# Patient Record
Sex: MALE | Race: WHITE | NOT HISPANIC OR LATINO | URBAN - METROPOLITAN AREA
[De-identification: names, ages, dates, MRNs, and addresses within clinical notes are randomized per-mention and may not be internally consistent; named-entity substitution may affect disease eponyms.]

---

## 2017-07-04 ENCOUNTER — INPATIENT (INPATIENT)
Facility: HOSPITAL | Age: 32
LOS: 0 days | Discharge: HOME | DRG: 394 | End: 2017-07-04
Attending: INTERNAL MEDICINE | Admitting: INTERNAL MEDICINE
Payer: COMMERCIAL

## 2017-07-04 VITALS
SYSTOLIC BLOOD PRESSURE: 161 MMHG | WEIGHT: 164.91 LBS | DIASTOLIC BLOOD PRESSURE: 112 MMHG | TEMPERATURE: 98 F | RESPIRATION RATE: 18 BRPM | OXYGEN SATURATION: 96 % | HEART RATE: 124 BPM

## 2017-07-04 VITALS
OXYGEN SATURATION: 99 % | DIASTOLIC BLOOD PRESSURE: 70 MMHG | HEART RATE: 70 BPM | RESPIRATION RATE: 18 BRPM | SYSTOLIC BLOOD PRESSURE: 115 MMHG | TEMPERATURE: 98 F

## 2017-07-04 DIAGNOSIS — R63.8 OTHER SYMPTOMS AND SIGNS CONCERNING FOOD AND FLUID INTAKE: ICD-10-CM

## 2017-07-04 DIAGNOSIS — Z98.890 OTHER SPECIFIED POSTPROCEDURAL STATES: Chronic | ICD-10-CM

## 2017-07-04 DIAGNOSIS — Z29.9 ENCOUNTER FOR PROPHYLACTIC MEASURES, UNSPECIFIED: ICD-10-CM

## 2017-07-04 DIAGNOSIS — K22.6 GASTRO-ESOPHAGEAL LACERATION-HEMORRHAGE SYNDROME: Chronic | ICD-10-CM

## 2017-07-04 DIAGNOSIS — T18.128A FOOD IN ESOPHAGUS CAUSING OTHER INJURY, INITIAL ENCOUNTER: ICD-10-CM

## 2017-07-04 LAB
ALBUMIN SERPL ELPH-MCNC: 5 G/DL — SIGNIFICANT CHANGE UP (ref 3.3–5)
ALP SERPL-CCNC: 49 U/L — SIGNIFICANT CHANGE UP (ref 40–120)
ALT FLD-CCNC: 32 U/L — SIGNIFICANT CHANGE UP (ref 10–45)
ANION GAP SERPL CALC-SCNC: 19 MMOL/L — HIGH (ref 5–17)
APTT BLD: 30.7 SEC — SIGNIFICANT CHANGE UP (ref 27.5–37.4)
AST SERPL-CCNC: 25 U/L — SIGNIFICANT CHANGE UP (ref 10–40)
BASOPHILS NFR BLD AUTO: 0.3 % — SIGNIFICANT CHANGE UP (ref 0–2)
BILIRUB SERPL-MCNC: 2.8 MG/DL — HIGH (ref 0.2–1.2)
BLD GP AB SCN SERPL QL: NEGATIVE — SIGNIFICANT CHANGE UP
BUN SERPL-MCNC: 11 MG/DL — SIGNIFICANT CHANGE UP (ref 7–23)
CALCIUM SERPL-MCNC: 10 MG/DL — SIGNIFICANT CHANGE UP (ref 8.4–10.5)
CHLORIDE SERPL-SCNC: 99 MMOL/L — SIGNIFICANT CHANGE UP (ref 96–108)
CO2 SERPL-SCNC: 22 MMOL/L — SIGNIFICANT CHANGE UP (ref 22–31)
CREAT SERPL-MCNC: 0.9 MG/DL — SIGNIFICANT CHANGE UP (ref 0.5–1.3)
EOSINOPHIL NFR BLD AUTO: 3.5 % — SIGNIFICANT CHANGE UP (ref 0–6)
GLUCOSE SERPL-MCNC: 92 MG/DL — SIGNIFICANT CHANGE UP (ref 70–99)
HCT VFR BLD CALC: 46.7 % — SIGNIFICANT CHANGE UP (ref 39–50)
HGB BLD-MCNC: 16.7 G/DL — SIGNIFICANT CHANGE UP (ref 13–17)
INR BLD: 1.06 — SIGNIFICANT CHANGE UP (ref 0.88–1.16)
LACTATE SERPL-SCNC: 1.2 MMOL/L — SIGNIFICANT CHANGE UP (ref 0.5–2)
LYMPHOCYTES # BLD AUTO: 13.2 % — SIGNIFICANT CHANGE UP (ref 13–44)
MCHC RBC-ENTMCNC: 30.6 PG — SIGNIFICANT CHANGE UP (ref 27–34)
MCHC RBC-ENTMCNC: 35.8 G/DL — SIGNIFICANT CHANGE UP (ref 32–36)
MCV RBC AUTO: 85.7 FL — SIGNIFICANT CHANGE UP (ref 80–100)
MONOCYTES NFR BLD AUTO: 8.3 % — SIGNIFICANT CHANGE UP (ref 2–14)
NEUTROPHILS NFR BLD AUTO: 74.7 % — SIGNIFICANT CHANGE UP (ref 43–77)
PLATELET # BLD AUTO: 251 K/UL — SIGNIFICANT CHANGE UP (ref 150–400)
POTASSIUM SERPL-MCNC: 3.8 MMOL/L — SIGNIFICANT CHANGE UP (ref 3.5–5.3)
POTASSIUM SERPL-SCNC: 3.8 MMOL/L — SIGNIFICANT CHANGE UP (ref 3.5–5.3)
PROT SERPL-MCNC: 8.1 G/DL — SIGNIFICANT CHANGE UP (ref 6–8.3)
PROTHROM AB SERPL-ACNC: 11.8 SEC — SIGNIFICANT CHANGE UP (ref 9.8–12.7)
RBC # BLD: 5.45 M/UL — SIGNIFICANT CHANGE UP (ref 4.2–5.8)
RBC # FLD: 12.1 % — SIGNIFICANT CHANGE UP (ref 10.3–16.9)
RH IG SCN BLD-IMP: POSITIVE — SIGNIFICANT CHANGE UP
RH IG SCN BLD-IMP: POSITIVE — SIGNIFICANT CHANGE UP
SODIUM SERPL-SCNC: 140 MMOL/L — SIGNIFICANT CHANGE UP (ref 135–145)
WBC # BLD: 11.5 K/UL — HIGH (ref 3.8–10.5)
WBC # FLD AUTO: 11.5 K/UL — HIGH (ref 3.8–10.5)

## 2017-07-04 PROCEDURE — 99285 EMERGENCY DEPT VISIT HI MDM: CPT | Mod: 25

## 2017-07-04 PROCEDURE — 93010 ELECTROCARDIOGRAM REPORT: CPT

## 2017-07-04 PROCEDURE — 71045 X-RAY EXAM CHEST 1 VIEW: CPT

## 2017-07-04 PROCEDURE — 85610 PROTHROMBIN TIME: CPT

## 2017-07-04 PROCEDURE — 36415 COLL VENOUS BLD VENIPUNCTURE: CPT

## 2017-07-04 PROCEDURE — 83605 ASSAY OF LACTIC ACID: CPT

## 2017-07-04 PROCEDURE — G0378: CPT

## 2017-07-04 PROCEDURE — 86850 RBC ANTIBODY SCREEN: CPT

## 2017-07-04 PROCEDURE — 99223 1ST HOSP IP/OBS HIGH 75: CPT | Mod: GC

## 2017-07-04 PROCEDURE — 86900 BLOOD TYPING SEROLOGIC ABO: CPT

## 2017-07-04 PROCEDURE — 93005 ELECTROCARDIOGRAM TRACING: CPT

## 2017-07-04 PROCEDURE — 80053 COMPREHEN METABOLIC PANEL: CPT

## 2017-07-04 PROCEDURE — 74220 X-RAY XM ESOPHAGUS 1CNTRST: CPT | Mod: 26

## 2017-07-04 PROCEDURE — 85025 COMPLETE CBC W/AUTO DIFF WBC: CPT

## 2017-07-04 PROCEDURE — 85730 THROMBOPLASTIN TIME PARTIAL: CPT

## 2017-07-04 PROCEDURE — 96374 THER/PROPH/DIAG INJ IV PUSH: CPT

## 2017-07-04 PROCEDURE — 70360 X-RAY EXAM OF NECK: CPT | Mod: 26

## 2017-07-04 PROCEDURE — 70360 X-RAY EXAM OF NECK: CPT

## 2017-07-04 PROCEDURE — 71010: CPT | Mod: 26

## 2017-07-04 PROCEDURE — 74220 X-RAY XM ESOPHAGUS 1CNTRST: CPT

## 2017-07-04 PROCEDURE — 86901 BLOOD TYPING SEROLOGIC RH(D): CPT

## 2017-07-04 RX ORDER — GLUCAGON INJECTION, SOLUTION 0.5 MG/.1ML
1 INJECTION, SOLUTION SUBCUTANEOUS ONCE
Qty: 0 | Refills: 0 | Status: COMPLETED | OUTPATIENT
Start: 2017-07-04 | End: 2017-07-04

## 2017-07-04 RX ORDER — PANTOPRAZOLE SODIUM 20 MG/1
1 TABLET, DELAYED RELEASE ORAL
Qty: 0 | Refills: 0 | COMMUNITY
Start: 2017-07-04

## 2017-07-04 RX ORDER — SODIUM CHLORIDE 9 MG/ML
1000 INJECTION INTRAMUSCULAR; INTRAVENOUS; SUBCUTANEOUS ONCE
Qty: 0 | Refills: 0 | Status: COMPLETED | OUTPATIENT
Start: 2017-07-04 | End: 2017-07-04

## 2017-07-04 RX ORDER — PANTOPRAZOLE SODIUM 20 MG/1
1 TABLET, DELAYED RELEASE ORAL
Qty: 30 | Refills: 0 | OUTPATIENT
Start: 2017-07-04 | End: 2017-08-03

## 2017-07-04 RX ORDER — PANTOPRAZOLE SODIUM 20 MG/1
40 TABLET, DELAYED RELEASE ORAL
Qty: 0 | Refills: 0 | Status: DISCONTINUED | OUTPATIENT
Start: 2017-07-04 | End: 2017-07-04

## 2017-07-04 RX ADMIN — GLUCAGON INJECTION, SOLUTION 1 MILLIGRAM(S): 0.5 INJECTION, SOLUTION SUBCUTANEOUS at 11:27

## 2017-07-04 RX ADMIN — SODIUM CHLORIDE 1000 MILLILITER(S): 9 INJECTION INTRAMUSCULAR; INTRAVENOUS; SUBCUTANEOUS at 11:01

## 2017-07-04 RX ADMIN — GLUCAGON INJECTION, SOLUTION 1 MILLIGRAM(S): 0.5 INJECTION, SOLUTION SUBCUTANEOUS at 13:57

## 2017-07-04 RX ADMIN — GLUCAGON INJECTION, SOLUTION 1 MILLIGRAM(S): 0.5 INJECTION, SOLUTION SUBCUTANEOUS at 12:06

## 2017-07-04 NOTE — DISCHARGE NOTE ADULT - CONDITION (STATED IN TERMS THAT PERMIT A SPECIFIC MEASURABLE COMPARISON WITH CONDITION ON ADMISSION):
pt improved significantly ever since presentation, initially tachycardic and high bp on presentation, currently vitally stable. On presentation pt couldn't tolerate oral sips which he is able to tolerate now and swallow down.

## 2017-07-04 NOTE — DISCHARGE NOTE ADULT - PATIENT PORTAL LINK FT
“You can access the [facility] Patient Portal, offered by [facility], by registering with the following website: [email or web address]”

## 2017-07-04 NOTE — H&P ADULT - NSHPLABSRESULTS_GEN_ALL_CORE
.  LABS:                         16.7   11.5  )-----------( 251      ( 04 Jul 2017 10:57 )             46.7     07-04    140  |  99  |  11  ----------------------------<  92  3.8   |  22  |  0.90    Ca    10.0      04 Jul 2017 10:57    TPro  8.1  /  Alb  5.0  /  TBili  2.8<H>  /  DBili  x   /  AST  25  /  ALT  32  /  AlkPhos  49  07-04    PT/INR - ( 04 Jul 2017 10:57 )   PT: 11.8 sec;   INR: 1.06          PTT - ( 04 Jul 2017 10:57 )  PTT:30.7 sec          Lactate, Blood: 1.2 mmoL/L (07-04 @ 10:57)      RADIOLOGY, EKG & ADDITIONAL TESTS: Reviewed.

## 2017-07-04 NOTE — ED PROVIDER NOTE - FAMILY HISTORY
Father  Still living? Unknown  Family history of ischemic heart disease, Age at diagnosis: Age Unknown  Family history of MI (myocardial infarction), Age at diagnosis: Age Unknown

## 2017-07-04 NOTE — H&P ADULT - ATTENDING COMMENTS
pt seen and examined by me. comofrtable. reports improvement in abdominal pain. no vomitting. no hematemesis.  VSS  exam- unremarkable  plan d/w GI. pt to get esophogram. NPO, GI following.

## 2017-07-04 NOTE — CONSULT NOTE ADULT - SUBJECTIVE AND OBJECTIVE BOX
32yo M w/ PMH Negin-Kay tear (2010; requiring several days ICU adm intubated, s/p surgical repair) presenting with the feeling of obstruction in his throat x18hrs after having a piece of steak and 2 cups black coffee, preventing him from tolerating PO solids or liquids. Pt states he's had this several times in the past 3 years but that it usually resolves on its own within a few hours; this episode is the longest its ever lasted. Denies dysphagia or problems swallowing but says that liquids and solids are getting "stuck" in his distal esophagus that eventually "backs up" and feels the need to regurgitate it out. Denies vomiting with this current episode but does say he was vomiting friday night 2/2 EtOH at a wedding. Endorses mild epigastric discomfort. Tried zantac and PPI with no improvement (pills get regurgitated); positional changes have no effect on ability to tolerate PO. No hematemesis at any point since his Negin-Kay tear. No F/C, dizziness, CP, SOB, N/V/D, melena, hematochezia, hematemesis, urinary sx, recent illness. Does not have a gastroenterologist and has not been scoped since his M-W tear in 2010.    PAST MEDICAL & SURGICAL HISTORY:  Negin-Kay tear        MEDICATIONS  (STANDING):    MEDICATIONS  (PRN):      Allergies    No Known Allergies    Intolerances        SOCIAL HISTORY: + EtOH     FAMILY HISTORY:  Family history of MI (myocardial infarction) (Father): Father w/ first MI @ age 40; 5 total MIs  Family history of ischemic heart disease (Father)      Vital Signs Last 24 Hrs  T(C): 37.1 (04 Jul 2017 10:59), Max: 37.1 (04 Jul 2017 10:59)  T(F): 98.7 (04 Jul 2017 10:59), Max: 98.7 (04 Jul 2017 10:59)  HR: 101 (04 Jul 2017 10:59) (101 - 124)  BP: 136/89 (04 Jul 2017 10:59) (136/89 - 161/112)  BP(mean): --  RR: 18 (04 Jul 2017 10:59) (18 - 18)  SpO2: 98% (04 Jul 2017 10:59) (96% - 98%)    PHYSICAL EXAM:    GEN: AOx3 NAD, unable to tolerate secretions  HEENT: anicteric  CHEST: equal chest rise b/l  CVS: no m/r/g  ABD: soft, nt, nd bs+      LABS:                        16.7   11.5  )-----------( 251      ( 04 Jul 2017 10:57 )             46.7     07-04    140  |  99  |  11  ----------------------------<  92  3.8   |  22  |  0.90    Ca    10.0      04 Jul 2017 10:57    TPro  8.1  /  Alb  5.0  /  TBili  2.8<H>  /  DBili  x   /  AST  25  /  ALT  32  /  AlkPhos  49  07-04    PT/INR - ( 04 Jul 2017 10:57 )   PT: 11.8 sec;   INR: 1.06          PTT - ( 04 Jul 2017 10:57 )  PTT:30.7 sec      RADIOLOGY & ADDITIONAL STUDIES:

## 2017-07-04 NOTE — H&P ADULT - FAMILY HISTORY
Father  Still living? Unknown  Family history of ischemic heart disease, Age at diagnosis: Age Unknown  Family history of MI (myocardial infarction), Age at diagnosis: Age Unknown     Grandparent  Still living? Unknown  Family history of colon cancer, Age at diagnosis: Age Unknown

## 2017-07-04 NOTE — ED PROVIDER NOTE - CHPI ED SYMPTOMS NEG
no diarrhea/no nausea/no blood in stool/no dysuria/no vomiting/no chills/no abdominal distension/no burning urination/no fever/no hematuria

## 2017-07-04 NOTE — DISCHARGE NOTE ADULT - PLAN OF CARE
-Eat small pieces of food  -Chew well before swallowing -Past Hx of elevated bilirubin-unknown cause  -Arrange out patient follow up to evaluate cause pt had food impaction on presentation, evaluated by gastroenterologist. -Eat small pieces of food  -Chew well before swallowing  -Continue PPI  -Follow up with Dr. Olivo -Past Hx of elevated bilirubin-unknown cause  -Follow up with PCP

## 2017-07-04 NOTE — ED PROVIDER NOTE - ATTENDING CONTRIBUTION TO CARE
pt with possible steak impaction x 18 hours, hx of Negin Kay tear in past - not tolerating po or secretions, glucagon, valium given and GI will admit for scope, pt stable at admission

## 2017-07-04 NOTE — ED ADULT TRIAGE NOTE - CHIEF COMPLAINT QUOTE
"I feel like something is stuck" pt c/o of "blockage" sensation to epigastric region x 18 hours, pt reports he has not been able to eat or drink anything without vomiting.

## 2017-07-04 NOTE — DISCHARGE NOTE ADULT - ADDITIONAL INSTRUCTIONS
Follow up with Dr. Olivo for evaluation Follow up with Dr. Olivo for GI evaluation   Follow up with PCP for evaluation of elevated bilirubin levels.

## 2017-07-04 NOTE — DISCHARGE NOTE ADULT - CARE PLAN
Principal Discharge DX:	Food impaction of esophagus  Instructions for follow-up, activity and diet:	-Eat small pieces of food  -Chew well before swallowing  Secondary Diagnosis:	Elevated bilirubin  Instructions for follow-up, activity and diet:	-Past Hx of elevated bilirubin-unknown cause  -Arrange out patient follow up to evaluate cause Principal Discharge DX:	Food impaction of esophagus  Goal:	pt had food impaction on presentation, evaluated by gastroenterologist.  Instructions for follow-up, activity and diet:	-Eat small pieces of food  -Chew well before swallowing  -Continue PPI  -Follow up with Dr. Olivo  Secondary Diagnosis:	Elevated bilirubin  Instructions for follow-up, activity and diet:	-Past Hx of elevated bilirubin-unknown cause  -Follow up with PCP

## 2017-07-04 NOTE — CONSULT NOTE ADULT - ASSESSMENT
31 year old male with hx of mack bernal tear presents with food impaction after eating steak. Pt will require endoscopic evaluation for removal of food particle.     -Can try glucagon 1mg x2 (10-15 min apart)  -Keep NPO  -Aspiration precautions   -Plan for Endoscopic removal     GI following.

## 2017-07-04 NOTE — DISCHARGE NOTE ADULT - MEDICATION SUMMARY - MEDICATIONS TO TAKE
I will START or STAY ON the medications listed below when I get home from the hospital:    pantoprazole 40 mg oral delayed release tablet  -- 1 tab(s) by mouth once a day (before a meal)  -- Indication: For Food impaction of esophagus

## 2017-07-04 NOTE — ED PROVIDER NOTE - MEDICAL DECISION MAKING DETAILS
pt likely w/ impacted food bolus in distal esophagus 2/2 h/o mack bernal tear- not tolerating liquids or solids by mouth; GI evaluating for EGD in afternoon if pt fails medical mgmt w/ glucagon and valium IVP - will admit to medicine in preparation for endoscopy.

## 2017-07-04 NOTE — ED PROVIDER NOTE - PROGRESS NOTE DETAILS
pertinent h/o mack-bernal tear, not able to tolerate any PO including water witnessed at bedside, pt spitting up saliva. will bolus 1L NS over 1hr, try glucagon 1mg IVP and consult GI for further recommendations and consideration for endoscopy GI fellow evaluating patient, agree w/ glucagon IVP and suggests valium if glucagon fails; consenting pt for EGD if unable to pass obstruction -- but unable to scope until later in afternoon if necessary. will admit to medicine for now.

## 2017-07-04 NOTE — ED ADULT NURSE NOTE - OBJECTIVE STATEMENT
Patient to ED alert and orientedx3, complaining of inability to keep food down since yesterday at 5pm. "I had two cups of coffee and then all of a sudden, I couldn't keep any food down; it just immediately comes back up. The last meal I had was the day before; I had consumed some steak." Patient with hx of mack-montse tear, reporting similar symptoms. Patient denies abdominal pain, diarrhea, but reports nausea and vomiting. No blood in vomitus. "I just feels like something is stuck or blocked in my stomach." Bowel sounds present in all 4 quadrants, hyperactive. No abdominal distention noted.

## 2017-07-04 NOTE — H&P ADULT - NSHPPHYSICALEXAM_GEN_ALL_CORE
.  VITAL SIGNS:  T(C): 36.7 (07-04-17 @ 12:15), Max: 37.1 (07-04-17 @ 10:59)  T(F): 98.1 (07-04-17 @ 12:15), Max: 98.7 (07-04-17 @ 10:59)  HR: 84 (07-04-17 @ 12:15) (84 - 124)  BP: 136/84 (07-04-17 @ 12:15) (136/84 - 161/112)  BP(mean): --  RR: 18 (07-04-17 @ 12:15) (18 - 18)  SpO2: 99% (07-04-17 @ 12:15) (96% - 99%)  Wt(kg): --    PHYSICAL EXAM:    Constitutional: WDWN resting comfortably in bed; NAD  Head: NC/AT  Neck: supple; no JVD or thyromegaly  Respiratory: CTA B/L  Cardiac: +S1/S2; RRR; no M/R/G  Gastrointestinal: abdomen soft, no distention, epigastric tenderness on deep palpation; no rebound or guarding; +BSx4  Extremities: WWP, no clubbing or cyanosis; no peripheral edema  Vascular: 2+ radial, DP/PT pulses B/L  Dermatologic: skin warm, dry and intact; no rashes, wounds, or scars  Neurologic: AAOx3  Psychiatric: affect and characteristics of appearance, verbalizations, behaviors are appropriate

## 2017-07-04 NOTE — H&P ADULT - PROBLEM SELECTOR PLAN 1
PMH of Negin Kay Tear in 2010, after which he experiences similar episodes of food getting stuck in the throat every 6 weeks which resolve spontaneously within 30-45 mins  Pt currently feeling better ever since Valium and glucagon but still can not tolerate oral sips completely which was witnessed at bed side  Pt on NPO  Discuss with GI regarding EGD  Pt being consented for EGD if obstruction does not pass.   Mechanical Stricture suspected as past hx of tear, Globus sensation could also be a possibility.  Currently feeling much better than when he came in.   Need for GI follow up discussed with the pt PMH of Negin Kay Tear in 2010, after which he experiences similar episodes of food getting stuck in the throat every 6 weeks which resolve spontaneously within 30-45 mins  Pt currently feeling better ever since Valium and glucagon but still can not tolerate oral sips completely which was witnessed at bed side  Pt on NPO  2 mg glucagon and 5 mg valium already given, as per GI recommendations another 1 mg glucagon being given to assess if any change in oral sips intake  Discuss with GI regarding EGD  Pt being consented for EGD if obstruction does not pass.   Mechanical Stricture suspected as past hx of tear, Globus sensation could also be a possibility.  Currently feeling much better than when he came in.   Need for GI follow up discussed with the pt PMH of Negin Kay Tear in 2010, after which he experiences similar episodes of food getting stuck in the throat every 6 weeks which resolve spontaneously within 30-45 mins  Pt currently feeling better ever since Valium and glucagon but still can not tolerate oral sips completely which was witnessed at bed side  Pt on NPO  2 mg glucagon and 5 mg valium already given, as per GI recommendations another 1 mg glucagon being given to assess if any change in oral sips intake  Discuss with GI regarding EGD  Pt being consented for EGD if obstruction does not pass.   Mechanical Stricture suspected as past hx of tear, Globus sensation could also be a possibility.  Currently feeling much better than when he came in.   Need for GI follow up discussed with the pt  As per GI recommendations, esophagram planned, if no obstruction discharge pt and arrange follow up with Dr. Olivo PMH of Negin Kay Tear in 2010, after which he experiences similar episodes of food getting stuck in the throat every 6 weeks which resolve spontaneously within 30-45 mins  Pt currently feeling better ever since 5 mg Valium and 3 mg total glucagon but still can not tolerate oral sips completely which was witnessed at bed side  Pt on NPO  Discuss with GI regarding EGD  As per GI recommendations, esophagram planned, if no obstruction discharge pt and arrange follow up with Dr. Olivo  Mechanical Stricture suspected as past hx of tear, Globus sensation could also be a possibility.  Currently feeling much better than when he came in.   Need for GI follow up discussed with the pt.  Known hx of elevated bilirubin- will need out pt follow up

## 2017-07-04 NOTE — DISCHARGE NOTE ADULT - CARE PROVIDER_API CALL
Griffin Olivo), Medicine  [address] [facility] [address]  [place] [address]  Phone: [phone]  Fax: [phone]

## 2017-07-04 NOTE — ED PROVIDER NOTE - OBJECTIVE STATEMENT
30yo M w/ PMH Negin-Kay tear (2010; requiring several days ICU adm intubated, s/p surgical repair) presenting with the feeling of obstruction in his throat x18hrs preventing him from tolerating PO solids or liquids. Pt states he's had this several times in the past 3 years but that it usually resolves on its own within a few hours; this episode is the longest it's ever lasted. Denies dysphagia or problems swallowing but says that liquids and solids are getting "stuck" in his distal esophagus that eventually "backs up" and feels the need to regurgitate it out. Denies vomiting with this current episode but does say he was vomiting friday night 2/2 EtOH at a wedding. Endorses mild epigastric discomfort. Tried zantac and PPI with no improvement (pills get regurgitated); positional changes have no effect on ability to tolerate PO. No hematemesis at any point since his Negin-Kay tear. No F/C, dizziness, CP, SOB, N/V/D, melena, hematochezia, hematemesis, urinary sx, recent illness. 30yo M w/ PMH Negin-Kay tear (2010; requiring several days ICU adm intubated, s/p surgical repair) presenting with the feeling of obstruction in his throat x18hrs after having a piece of steak and 2 cups black coffee, preventing him from tolerating PO solids or liquids. Pt states he's had this several times in the past 3 years but that it usually resolves on its own within a few hours; this episode is the longest its ever lasted. Denies dysphagia or problems swallowing but says that liquids and solids are getting "stuck" in his distal esophagus that eventually "backs up" and feels the need to regurgitate it out. Denies vomiting with this current episode but does say he was vomiting friday night 2/2 EtOH at a wedding. Endorses mild epigastric discomfort. Tried zantac and PPI with no improvement (pills get regurgitated); positional changes have no effect on ability to tolerate PO. No hematemesis at any point since his Negin-Aky tear. No F/C, dizziness, CP, SOB, N/V/D, melena, hematochezia, hematemesis, urinary sx, recent illness. 32yo M w/ PMH Negin-Kay tear (2010; requiring several days ICU adm intubated, s/p surgical repair) presenting with the feeling of obstruction in his throat x18hrs after having a piece of steak and 2 cups black coffee, preventing him from tolerating PO solids or liquids. Pt states he's had this several times in the past 3 years but that it usually resolves on its own within a few hours; this episode is the longest its ever lasted. Denies dysphagia or problems swallowing but says that liquids and solids are getting "stuck" in his distal esophagus that eventually "backs up" and feels the need to regurgitate it out. Denies vomiting with this current episode but does say he was vomiting friday night 2/2 EtOH at a wedding. Endorses mild epigastric discomfort. Tried zantac and PPI with no improvement (pills get regurgitated); positional changes have no effect on ability to tolerate PO. No hematemesis at any point since his Negin-Kay tear. No F/C, dizziness, CP, SOB, N/V/D, melena, hematochezia, hematemesis, urinary sx, recent illness. Does not have a gastroenterologist and has not been scoped since his M-W tear in 2010.

## 2017-07-04 NOTE — H&P ADULT - ASSESSMENT
Pt is a 30 yo M w/PMH of Negin Kay Tear in 2010 (admitted in the ICU and surgically operated) presenting to the ED which feeling of food stuck in throat since yesterday 5 pm after he had a piece of his steak. Currently he can not take oral sips completely after glucagon and Valium given. Mild epigastric pain and pain on left side of his chest radiating to his back, which has decreased now. Pt put on NPO, EGD might be needed if obstruction does not pass.
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
## 2017-07-07 DIAGNOSIS — Z87.19 PERSONAL HISTORY OF OTHER DISEASES OF THE DIGESTIVE SYSTEM: ICD-10-CM

## 2017-07-07 DIAGNOSIS — R17 UNSPECIFIED JAUNDICE: ICD-10-CM

## 2017-07-07 DIAGNOSIS — T18.128A FOOD IN ESOPHAGUS CAUSING OTHER INJURY, INITIAL ENCOUNTER: ICD-10-CM
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
## 2020-10-01 NOTE — ED PROVIDER NOTE - PSYCHIATRIC, MLM
Client will plan to return Oct 29 at 8am. He will continue to practice self-care and mindful moments to manage emotional distress. He will also practice kind self-talk to reduce depression and enhance self-worth.    Alert and oriented to person, place, time/situation. normal mood and affect. no apparent risk to self or others.

## 2022-11-29 NOTE — H&P ADULT - REASON FOR ADMISSION
Post-Op Assessment Note    CV Status:  Stable  Pain Score: 0    Pain management: adequate     Mental Status:  Alert and awake   Hydration Status:  Euvolemic   PONV Controlled:  Controlled   Airway Patency:  Patent      Post Op Vitals Reviewed: Yes      Staff: CRNA, Anesthesiologist         No notable events documented      BP      Temp     Pulse     Resp      SpO2 Foreign Body- GI

## 2024-09-20 NOTE — H&P ADULT - NSHPPOAPRESSUREULCER_GEN_ALL_CORE
"Pt had an uneventful shift this day. Pt mood presents as calm, pleasant with flat affect. Pt denies SI, SIB, HI and thoughts of harming others. Pt denies depression, endorses anxiety at 5/10 and requested/received PRN seroquel same. Pt denies A/VH. Pt stated he feels less anxious after PRN intervention. Pt spent most of the day isolated to his room. Pt refused encouragement to join group activity. Upon check in, pt stated he doesn't \"feel like himself\" which he attributes to not taking an selective serotonin reuptake inhibitor.    Pt requests prolactin level.    VS reviewed: /76 (BP Location: Left arm)   Pulse 84   Temp 98  F (36.7  C)   Resp 16   Ht 1.854 m (6' 1\")   Wt 94.8 kg (209 lb)   SpO2 97%   BMI 27.57 kg/m   . Patient denies  pain.    Length of stay: 17  " no